# Patient Record
(demographics unavailable — no encounter records)

---

## 2025-05-06 NOTE — DISCUSSION/SUMMARY
[FreeTextEntry1] : 3 day old male presents for initial  visit. Appears clinically well. No growth, developmental, elimination, feeding, skin and sleep concerns.  The following  anticipatory guidance topics were discussed and/or handouts given:  transition,  care, nutritional adequacy, parental well-being and safety.  Recommend exclusive breastfeeding, 8-12 feedings per day. Mother should continue prenatal vitamins and avoid alcohol. If formula is needed, recommend iron-fortified formulations every 2-3 hrs.  When in car, patient should be in rear-facing car seat in back seat. Air dry umbillical stump. Put baby to sleep on back, in own crib with no loose or soft bedding.  Limit baby's exposure to others, especially those with fever or unknown vaccine status.   Return in 48 hours for weight check.

## 2025-05-06 NOTE — PHYSICAL EXAM
[Alert] : alert [Normocephalic] : normocephalic [Flat Open Anterior [place]] : flat open anterior fontanelle [PERRL] : PERRL [Red Reflex Bilateral] : red reflex bilateral [Normally Placed Ears] : normally placed ears [Auricles Well Formed] : auricles well formed [Clear Tympanic membranes] : clear tympanic membranes [Light reflex present] : light reflex present [Bony structures visible] : bony structures visible [Patent Auditory Canal] : patent auditory canal [Nares Patent] : nares patent [Palate Intact] : palate intact [Uvula Midline] : uvula midline [Supple, full passive range of motion] : supple, full passive range of motion [Symmetric Chest Rise] : symmetric chest rise [Clear to Auscultation Bilaterally] : clear to auscultation bilaterally [Regular Rate and Rhythm] : regular rate and rhythm [S1, S2 present] : S1, S2 present [+2 Femoral Pulses] : +2 femoral pulses [Soft] : soft [Bowel Sounds] : bowel sounds present [Umbilical Stump Dry, Clean, Intact] : umbilical stump dry, clean, intact [Normal external genitailia] : normal external genitalia [Central Urethral Opening] : central urethral opening [Testicles Descended Bilaterally] : testicles descended bilaterally [Patent] : patent [Normally Placed] : normally placed [No Abnormal Lymph Nodes Palpated] : no abnormal lymph nodes palpated [Symmetric Flexed Extremities] : symmetric flexed extremities [Startle Reflex] : startle reflex present [Suck Reflex] : suck reflex present [Rooting] : rooting reflex present [Palmar Grasp] : palmar grasp present [Plantar Grasp] : plantar reflex present [Symmetric Daniel] : symmetric [place] [Acute Distress] : no acute distress [Icteric sclera] : nonicteric sclera [Discharge] : no discharge [Palpable Masses] : no palpable masses [Murmurs] : no murmurs [Tender] : nontender [Distended] : not distended [Hepatomegaly] : no hepatomegaly [Splenomegaly] : no splenomegaly [Davila-Ortolani] : negative Davila-Ortolani [Spinal Dimple] : no spinal dimple [Tuft of Hair] : no tuft of hair [Jaundice] : not jaundice

## 2025-05-06 NOTE — HISTORY OF PRESENT ILLNESS
[Born at ___ Wks Gestation] : The patient was born at [unfilled] weeks gestation [] : via normal spontaneous vaginal delivery [Other: _____] : at [unfilled] [(1) _____] : [unfilled] [(5) _____] : [unfilled] [BW: _____] : weight of [unfilled] [Age: ___] : [unfilled] year old mother [G: ___] : G [unfilled] [P: ___] : P [unfilled] [Significant Hx: ____] : The mother's  medical history is significant for [unfilled] [Rubella (Immune)] : Rubella immune [None] : There are no risk factors [Yes] : Yes [] : Circumcision: Yes [Breast milk] : breast milk [Hours between feeds ___] : Child is fed every [unfilled] hours [Vitamins ___] : Patient takes [unfilled] vitamins daily [Normal] : Normal [Yellow] : yellow [Loose] : loose consistency [In Bassinet/Crib] : sleeps in bassinet/crib [On back] : sleeps on back [No] : No cigarette smoke exposure [Water heater temperature set at <120 degrees F] : Water heater temperature set at <120 degrees F [Rear facing car seat in back seat] : Rear facing car seat in back seat [Carbon Monoxide Detectors] : Carbon monoxide detectors at home [Smoke Detectors] : Smoke detectors at home. [Hepatitis B Vaccine Given] : Hepatitis B vaccine given [HC: _____] : head circumference of [unfilled] [HepBsAG] : HepBsAg negative [HIV] : HIV negative [HepC] : Hepatitis C negative [GBS] : GBS negative [VDRL/RPR (Reactive)] : VDRL/RPR nonreactive [Co-sleeping] : no co-sleeping [Loose bedding, pillow, toys, and/or bumpers in crib] : no loose bedding, pillow, toys, and/or bumpers in crib [Pacifier] : Not using pacifier [Exposure to electronic nicotine delivery system] : No exposure to electronic nicotine delivery system [FreeTextEntry7] : initial  visit  [de-identified] : general  questions

## 2025-05-08 NOTE — PHYSICAL EXAM
[Normal External Genitalia] : normal external genitalia [Circumcised] : circumcised [Negative Ortalani/Davila] : negative Ortalani/Davila [NL] : normotonic [FreeTextEntry9] : cord intact [FreeTextEntry6] : testes descended B/L [de-identified] : kimmy-anal erythema

## 2025-05-08 NOTE — HISTORY OF PRESENT ILLNESS
[de-identified] : Weight check [FreeTextEntry6] : breast feeding on demand will sleep 2-3 hours at a stretch. concerned about diaper rash

## 2025-06-05 NOTE — DISCUSSION/SUMMARY
[FreeTextEntry1] : 1 mo M here for [facility]. Growing and developing appropriately to date. Hx of reflux- likely physiologic reflux.  Has had some ? mucous stools- if worsening can consider FOBT- low suspicion for CMPA.   Due for hep B vaccines today. After discussing risks/ benefits, parent in agreement with administration.  VIS given.  Counseling: -Recommend exclusive breastfeeding, 8-12 feedings per day. Mother should continue prenatal vitamins and avoid alcohol. Recommended to start vit D for baby if breastfeeding. If formula is needed, recommend iron-fortified formulations, 2-4 oz every 2-3 hrs.  -When in car, patient should be in rear-facing car seat in back seat.  -Put baby to sleep on back, in own crib with no loose or soft bedding. Help baby to develop sleep and feeding routines. May offer pacifier if needed.  -Start tummy time when awake.  -Limit baby's exposure to others, especially those with fever or unknown vaccine status.  -Parents counseled to call if rectal temperature >100.4 degrees F.

## 2025-06-05 NOTE — HISTORY OF PRESENT ILLNESS
[Breast milk] : breast milk [Hours between feeds ___] : Child is fed every [unfilled] hours [Vitamins ___] : Patient takes [unfilled] vitamins daily [___ voids per day] : [unfilled] voids per day [Frequency of stools: ___] : Frequency of stools: [unfilled]  stools [Yellow] : yellow [Seedy] : seedy [In Bassinet/Crib] : sleeps in bassinet/crib [On back] : sleeps on back [Loose bedding, pillow, toys, and/or bumpers in crib] : loose bedding, pillow, toys, and/or bumpers in crib [Co-sleeping] : no co-sleeping [Pacifier use] : not using pacifier [At risk for exposure to TB] : Not at risk for exposure to Tuberculosis  [de-identified] : intermittent reflux concerns- happy spitter? [FreeTextEntry8] : ? mucous

## 2025-06-05 NOTE — PHYSICAL EXAM
[Acute Distress] : no acute distress [Discharge] : no discharge [Palpable Masses] : no palpable masses [Murmurs] : no murmurs [Tender] : nontender [Distended] : not distended [Hepatomegaly] : no hepatomegaly [Splenomegaly] : no splenomegaly [Davila-Ortolani] : negative Davila-Ortolani [Spinal Dimple] : no spinal dimple [Tuft of Hair] : no tuft of hair [Jaundice] : no jaundice [Rash and/or lesion present] : no rash/lesion

## 2025-06-05 NOTE — DEVELOPMENTAL MILESTONES
[Normal Development] : Normal Development [Calms when picked up or spoken to] : calms when picked up or spoken to [Looks briefly at objects] : looks briefly at objects [Alerts to unexpected sound] : alerts to unexpected sound [Makes brief short vowel sounds] : makes brief short vowel sounds [Holds chin up in prone] : holds chin up in prone [Holds fingers more open at rest] : holds fingers more open at rest [FreeTextEntry1] : NOT COMPLETED

## 2025-06-05 NOTE — DISCUSSION/SUMMARY
[FreeTextEntry1] : 1 mo M here for [facility]. Growing and developing appropriately to date. Hx of reflux- likely physiologic reflux.  Has had some ? mucous stools- if worsening can consider FOBT- low suspicion for CMPA.   Due for hep B vaccines today. After discussing risks/ benefits, parent in agreement with administration.  VIS given.  Counseling: -Recommend exclusive breastfeeding, 8-12 feedings per day. Mother should continue prenatal vitamins and avoid alcohol. Recommended to start vit D for baby if breastfeeding. If formula is needed, recommend iron-fortified formulations, 2-4 oz every 2-3 hrs.  -When in car, patient should be in rear-facing car seat in back seat.  -Put baby to sleep on back, in own crib with no loose or soft bedding. Help baby to develop sleep and feeding routines. May offer pacifier if needed.  -Start tummy time when awake.  -Limit baby's exposure to others, especially those with fever or unknown vaccine status.  -Parents counseled to call if rectal temperature >100.4 degrees F.

## 2025-06-05 NOTE — HISTORY OF PRESENT ILLNESS
[Breast milk] : breast milk [Hours between feeds ___] : Child is fed every [unfilled] hours [Vitamins ___] : Patient takes [unfilled] vitamins daily [___ voids per day] : [unfilled] voids per day [Frequency of stools: ___] : Frequency of stools: [unfilled]  stools [Yellow] : yellow [Seedy] : seedy [In Bassinet/Crib] : sleeps in bassinet/crib [On back] : sleeps on back [Loose bedding, pillow, toys, and/or bumpers in crib] : loose bedding, pillow, toys, and/or bumpers in crib [Co-sleeping] : no co-sleeping [Pacifier use] : not using pacifier [At risk for exposure to TB] : Not at risk for exposure to Tuberculosis  [de-identified] : intermittent reflux concerns- happy spitter? [FreeTextEntry8] : ? mucous

## 2025-07-12 NOTE — HISTORY OF PRESENT ILLNESS
[Mother] : mother [Breast milk] : breast milk [Hours between feeds ___] : Child is fed every [unfilled] hours [Normal] : Normal [Frequency of stools: ___] : Frequency of stools: [unfilled]  stools [per day] : per day. [In Bassinet/Crib] : sleeps in bassinet/crib [On back] : sleeps on back [No] : No cigarette smoke exposure [Water heater temperature set at <120 degrees F] : Water heater temperature set at <120 degrees F [Rear facing car seat in back seat] : Rear facing car seat in back seat [Carbon Monoxide Detectors] : Carbon monoxide detectors at home [Smoke Detectors] : Smoke detectors at home. [Co-sleeping] : no co-sleeping [Loose bedding, pillow, toys, and/or bumpers in crib] : no loose bedding, pillow, toys, and/or bumpers in crib [Exposure to electronic nicotine delivery system] : No exposure to electronic nicotine delivery system [At risk for exposure to TB] : Not at risk for exposure to Tuberculosis  [FreeTextEntry7] : laying down likes to put head to right but able to move both ways on tummy time able to also lay both ways when sleeping.  [de-identified] : spit ups - non projectile/forceful - not bothered by it.  Burps with every feeding.  [FreeTextEntry8] : Gassy  [de-identified] : due for 2 mo vaccines [FreeTextEntry3] : wakes 1x/night

## 2025-07-12 NOTE — DISCUSSION/SUMMARY
[Normal Growth] : growth [Normal Development] : development  [No Elimination Concerns] : elimination [Continue Regimen] : feeding [No Skin Concerns] : skin [Normal Sleep Pattern] : sleep [None] : no medical problems [Anticipatory Guidance Given] : Anticipatory guidance addressed as per the history of present illness section [Parental (Maternal) Well-Being] : parental (maternal) well-being [Infant-Family Synchrony] : infant-family synchrony [Nutritional Adequacy] : nutritional adequacy [Infant Behavior] : infant behavior [Safety] : safety [Age Approp Vaccines] : Age appropriate vaccines administered [No Medications] : ~He/She~ is not on any medications [Mother] : mother [Parental Concerns Addressed] : Parental concerns addressed [] : The components of the vaccine(s) to be administered today are listed in the plan of care. The disease(s) for which the vaccine(s) are intended to prevent and the risks have been discussed with the caretaker.  The risks are also included in the appropriate vaccination information statements which have been provided to the patient's caregiver.  The caregiver has given consent to vaccinate. [FreeTextEntry1] :    2 month old male currently well with good weight gain. Recommend cont breastfeeding, 8-12 feedings per day. Mother should continue prenatal vitamins and avoid alcohol. If formula is needed, recommend iron-fortified formulations, 2-4 oz every 3-4 hrs.  To continue vitD infant drops as directed. d/wj mom that he has mild flattness on right - will continue to rotate to both sides - will monitor - if tightness or increased flatness will call office for reeval - will reassess at next visit.  When in car, patient should be in rear-facing car seat in back seat. Put baby to sleep on back, in own crib with no loose or soft bedding. Help baby to maintain sleep and feeding routines. May offer pacifier if needed. Continue tummy time when awake. Mom counseled to call if rectal temperature >100.4 degrees F. Masking, social distancing and hand hygiene reviewed. d/w Mom the following vaccines - Dtap/IPV/HIB, PCV & ROTA - risks/benefits/side effects reviewed - Mom agrees to vaccination today without questions.  VIS given. Return in 2 months for well care Return sooner PRN Mom without questions at this time.

## 2025-07-12 NOTE — PHYSICAL EXAM
[Alert] : alert [Acute Distress] : no acute distress [Flat Open Anterior [place]] : flat open anterior fontanelle [PERRL] : PERRL [Red Reflex Bilateral] : red reflex bilateral [Normally Placed Ears] : normally placed ears [Auricles Well Formed] : auricles well formed [Clear Tympanic membranes] : clear tympanic membranes [Light reflex present] : light reflex present [Bony landmarks visible] : bony landmarks visible [Discharge] : no discharge [Nares Patent] : nares patent [Palate Intact] : palate intact [Uvula Midline] : uvula midline [Supple, full passive range of motion] : supple, full passive range of motion [Palpable Masses] : no palpable masses [Symmetric Chest Rise] : symmetric chest rise [Clear to Auscultation Bilaterally] : clear to auscultation bilaterally [Regular Rate and Rhythm] : regular rate and rhythm [S1, S2 present] : S1, S2 present [Murmurs] : no murmurs [+2 Femoral Pulses] : +2 femoral pulses [Soft] : soft [Tender] : nontender [Distended] : not distended [Bowel Sounds] : bowel sounds present [Hepatomegaly] : no hepatomegaly [Splenomegaly] : no splenomegaly [Normal external genitailia] : normal external genitalia [Central Urethral Opening] : central urethral opening [Testicles Descended Bilaterally] : testicles descended bilaterally [Normally Placed] : normally placed [No Abnormal Lymph Nodes Palpated] : no abnormal lymph nodes palpated [Davila-Ortolani] : negative Davila-Ortolani [Symmetric Flexed Extremities] : symmetric flexed extremities [Spinal Dimple] : no spinal dimple [Tuft of Hair] : no tuft of hair [Startle Reflex] : startle reflex present [Suck Reflex] : suck reflex present [Rooting] : rooting reflex present [Palmar Grasp] : palmar grasp reflex present [Plantar Grasp] : plantar grasp reflex present [Symmetric Daniel] : symmetric [place] [Rash and/or lesion present] : no rash/lesion [FreeTextEntry2] : very mild flatness on right occiput.  [de-identified] : no neck tightness

## 2025-07-12 NOTE — HISTORY OF PRESENT ILLNESS
[Mother] : mother [Breast milk] : breast milk [Hours between feeds ___] : Child is fed every [unfilled] hours [Normal] : Normal [Frequency of stools: ___] : Frequency of stools: [unfilled]  stools [per day] : per day. [In Bassinet/Crib] : sleeps in bassinet/crib [On back] : sleeps on back [No] : No cigarette smoke exposure [Water heater temperature set at <120 degrees F] : Water heater temperature set at <120 degrees F [Rear facing car seat in back seat] : Rear facing car seat in back seat [Carbon Monoxide Detectors] : Carbon monoxide detectors at home [Smoke Detectors] : Smoke detectors at home. [Co-sleeping] : no co-sleeping [Loose bedding, pillow, toys, and/or bumpers in crib] : no loose bedding, pillow, toys, and/or bumpers in crib [Exposure to electronic nicotine delivery system] : No exposure to electronic nicotine delivery system [At risk for exposure to TB] : Not at risk for exposure to Tuberculosis  [FreeTextEntry7] : laying down likes to put head to right but able to move both ways on tummy time able to also lay both ways when sleeping.  [de-identified] : spit ups - non projectile/forceful - not bothered by it.  Burps with every feeding.  [FreeTextEntry8] : Gassy  [FreeTextEntry3] : wakes 1x/night  [de-identified] : due for 2 mo vaccines

## 2025-07-12 NOTE — PHYSICAL EXAM
[Alert] : alert [Acute Distress] : no acute distress [Flat Open Anterior [place]] : flat open anterior fontanelle [PERRL] : PERRL [Red Reflex Bilateral] : red reflex bilateral [Normally Placed Ears] : normally placed ears [Auricles Well Formed] : auricles well formed [Clear Tympanic membranes] : clear tympanic membranes [Light reflex present] : light reflex present [Bony landmarks visible] : bony landmarks visible [Discharge] : no discharge [Nares Patent] : nares patent [Palate Intact] : palate intact [Uvula Midline] : uvula midline [Supple, full passive range of motion] : supple, full passive range of motion [Palpable Masses] : no palpable masses [Symmetric Chest Rise] : symmetric chest rise [Clear to Auscultation Bilaterally] : clear to auscultation bilaterally [Regular Rate and Rhythm] : regular rate and rhythm [S1, S2 present] : S1, S2 present [Murmurs] : no murmurs [+2 Femoral Pulses] : +2 femoral pulses [Soft] : soft [Tender] : nontender [Distended] : not distended [Bowel Sounds] : bowel sounds present [Hepatomegaly] : no hepatomegaly [Splenomegaly] : no splenomegaly [Normal external genitailia] : normal external genitalia [Central Urethral Opening] : central urethral opening [Testicles Descended Bilaterally] : testicles descended bilaterally [Normally Placed] : normally placed [No Abnormal Lymph Nodes Palpated] : no abnormal lymph nodes palpated [Davila-Ortolani] : negative Davila-Ortolani [Symmetric Flexed Extremities] : symmetric flexed extremities [Spinal Dimple] : no spinal dimple [Tuft of Hair] : no tuft of hair [Startle Reflex] : startle reflex present [Suck Reflex] : suck reflex present [Rooting] : rooting reflex present [Palmar Grasp] : palmar grasp reflex present [Plantar Grasp] : plantar grasp reflex present [Symmetric Daniel] : symmetric [place] [Rash and/or lesion present] : no rash/lesion [FreeTextEntry2] : very mild flatness on right occiput.  [de-identified] : no neck tightness

## 2025-07-25 NOTE — DISCUSSION/SUMMARY
[FreeTextEntry1] :  2 mo M here for URI.   Viral URI: Recommend supportive care including antipyretics, fluids, humidifier, steamy shower, and nasal saline followed by nasal suction. Can trial NS nebs as recommended.  Monitor UO, ensure hydration.

## 2025-07-25 NOTE — HISTORY OF PRESENT ILLNESS
[de-identified] : cough [FreeTextEntry6] : 4-5 days of runny nose, cough started day after Seems deeper,  ? barky/ seal Feeding well No distress Stooling well, good UOP

## 2025-07-25 NOTE — HISTORY OF PRESENT ILLNESS
[de-identified] : cough [FreeTextEntry6] : 4-5 days of runny nose, cough started day after Seems deeper,  ? barky/ seal Feeding well No distress Stooling well, good UOP